# Patient Record
Sex: MALE | Race: WHITE | NOT HISPANIC OR LATINO | Employment: OTHER | URBAN - METROPOLITAN AREA
[De-identification: names, ages, dates, MRNs, and addresses within clinical notes are randomized per-mention and may not be internally consistent; named-entity substitution may affect disease eponyms.]

---

## 2024-01-25 PROBLEM — K64.8 INTERNAL HEMORRHOIDS: Status: ACTIVE | Noted: 2024-01-25

## 2024-01-25 PROBLEM — S86.819A STRAIN OF CALF MUSCLE: Status: ACTIVE | Noted: 2024-01-25

## 2024-01-25 PROBLEM — E80.6 HYPERBILIRUBINEMIA: Status: ACTIVE | Noted: 2024-01-25

## 2024-01-25 PROBLEM — M43.10 DEGENERATIVE SPONDYLOLISTHESIS: Status: ACTIVE | Noted: 2024-01-25

## 2024-01-25 PROBLEM — M54.2 NECK PAIN: Status: ACTIVE | Noted: 2024-01-25

## 2024-01-25 PROBLEM — R73.01 IFG (IMPAIRED FASTING GLUCOSE): Status: ACTIVE | Noted: 2024-01-25

## 2024-01-25 PROBLEM — I10 HTN (HYPERTENSION): Status: ACTIVE | Noted: 2024-01-25

## 2024-01-25 PROBLEM — M51.26 HERNIATED LUMBAR INTERVERTEBRAL DISC: Status: ACTIVE | Noted: 2024-01-25

## 2024-01-25 PROBLEM — M54.41 LOW BACK PAIN WITH RIGHT-SIDED SCIATICA: Status: ACTIVE | Noted: 2024-01-25

## 2024-01-25 PROBLEM — N28.1 CYST OF LEFT KIDNEY: Status: ACTIVE | Noted: 2024-01-25

## 2024-01-25 PROBLEM — I44.7 LEFT BUNDLE BRANCH BLOCK (LBBB): Status: ACTIVE | Noted: 2024-01-25

## 2024-01-25 PROBLEM — F32.A ANXIETY AND DEPRESSION: Status: ACTIVE | Noted: 2024-01-25

## 2024-01-25 PROBLEM — K76.89 LIVER CYST: Status: ACTIVE | Noted: 2024-01-25

## 2024-01-25 PROBLEM — F41.9 ANXIETY AND DEPRESSION: Status: ACTIVE | Noted: 2024-01-25

## 2024-01-25 PROBLEM — Z86.0100 HISTORY OF COLON POLYPS: Status: ACTIVE | Noted: 2024-01-25

## 2024-01-25 PROBLEM — K57.90 DIVERTICULOSIS: Status: ACTIVE | Noted: 2024-01-25

## 2024-01-25 PROBLEM — K21.9 GERD (GASTROESOPHAGEAL REFLUX DISEASE): Status: ACTIVE | Noted: 2024-01-25

## 2024-01-25 PROBLEM — Z86.010 HISTORY OF COLON POLYPS: Status: ACTIVE | Noted: 2024-01-25

## 2024-01-25 PROBLEM — E66.9 OBESITY: Status: ACTIVE | Noted: 2024-01-25

## 2024-02-01 ENCOUNTER — OFFICE VISIT (OUTPATIENT)
Dept: CARDIOLOGY | Facility: CLINIC | Age: 74
End: 2024-02-01
Payer: MEDICARE

## 2024-02-01 VITALS
HEIGHT: 72 IN | BODY MASS INDEX: 29.85 KG/M2 | SYSTOLIC BLOOD PRESSURE: 124 MMHG | DIASTOLIC BLOOD PRESSURE: 74 MMHG | OXYGEN SATURATION: 97 % | HEART RATE: 77 BPM | WEIGHT: 220.4 LBS

## 2024-02-01 DIAGNOSIS — I10 HYPERTENSION, UNSPECIFIED TYPE: ICD-10-CM

## 2024-02-01 DIAGNOSIS — I44.7 LEFT BUNDLE BRANCH BLOCK (LBBB): Primary | ICD-10-CM

## 2024-02-01 DIAGNOSIS — E66.3 OVERWEIGHT: ICD-10-CM

## 2024-02-01 DIAGNOSIS — R53.83 FATIGUE, UNSPECIFIED TYPE: ICD-10-CM

## 2024-02-01 DIAGNOSIS — K21.9 GASTROESOPHAGEAL REFLUX DISEASE WITHOUT ESOPHAGITIS: ICD-10-CM

## 2024-02-01 PROCEDURE — 1159F MED LIST DOCD IN RCRD: CPT | Performed by: INTERNAL MEDICINE

## 2024-02-01 PROCEDURE — 1160F RVW MEDS BY RX/DR IN RCRD: CPT | Performed by: INTERNAL MEDICINE

## 2024-02-01 PROCEDURE — 99215 OFFICE O/P EST HI 40 MIN: CPT | Performed by: INTERNAL MEDICINE

## 2024-02-01 PROCEDURE — 3074F SYST BP LT 130 MM HG: CPT | Performed by: INTERNAL MEDICINE

## 2024-02-01 PROCEDURE — 3078F DIAST BP <80 MM HG: CPT | Performed by: INTERNAL MEDICINE

## 2024-02-01 PROCEDURE — 93000 ELECTROCARDIOGRAM COMPLETE: CPT | Performed by: INTERNAL MEDICINE

## 2024-02-01 RX ORDER — HYDROCHLOROTHIAZIDE 12.5 MG/1
12.5 TABLET ORAL DAILY
COMMUNITY

## 2024-02-01 RX ORDER — VALSARTAN 80 MG/1
80 TABLET ORAL DAILY
COMMUNITY

## 2024-02-01 RX ORDER — TIZANIDINE 2 MG/1
TABLET ORAL
COMMUNITY
Start: 2020-08-07

## 2024-02-01 RX ORDER — PAROXETINE HYDROCHLORIDE 20 MG/1
30 TABLET, FILM COATED ORAL DAILY
COMMUNITY
Start: 2024-01-28

## 2024-02-01 RX ORDER — ALPRAZOLAM 0.25 MG/1
TABLET ORAL
COMMUNITY

## 2024-02-01 NOTE — PROGRESS NOTES
Subjective  Ruslan Espinosa  is a 73 y.o. year old male who presents for LBBB.  HE had COVID about a week ago. Feels faytgued in the afternoo and wishes to cut back on Valsartn    Blood pressure 124/74, pulse 77, height 1.829 m (6'), weight 100 kg (220 lb 6.4 oz), SpO2 97 %.   Metoprolol, Ciprofloxacin, Diltiazem hcl, Metronidazole, and Terazosin  Past Medical History:   Diagnosis Date    Neuralgia and neuritis, unspecified 10/27/2014    Nerve pain    Personal history of colonic polyps 10/27/2014    History of colonic polyps    Personal history of other diseases of the digestive system 02/13/2019    History of diverticulitis of colon    Personal history of other mental and behavioral disorders     History of panic attacks     Past Surgical History:   Procedure Laterality Date    OTHER SURGICAL HISTORY  04/01/2019    Colonoscopy    OTHER SURGICAL HISTORY  10/27/2014    Wrist Surgery Left    SINUS SURGERY  10/27/2014    Sinus Surgery     No family history on file.  @SOC    Current Outpatient Medications   Medication Sig Dispense Refill    PARoxetine (Paxil) 20 mg tablet Take 1.5 tablets (30 mg) by mouth once daily. Take 1 and 1/2 (one and one-half) tablets by mouth daily.      tiZANidine (Zanaflex) 2 mg tablet Take by mouth.      ALPRAZolam (Xanax) 0.25 mg tablet TAKE 1 TABLET BY MOUTH EVERY 6 TO 8 HOURS AS NEEDED      hydroCHLOROthiazide (HYDRODiuril) 12.5 mg tablet Take 1 tablet (12.5 mg) by mouth once daily.      valsartan (Diovan) 80 mg tablet Take 1 tablet (80 mg) by mouth once daily. for blood pressure       No current facility-administered medications for this visit.        ROS  Review of Systems   All other systems reviewed and are negative.      Physical Exam  Physical Exam  Constitutional:       Appearance: Normal appearance.   HENT:      Head: Normocephalic and atraumatic.   Eyes:      Extraocular Movements: Extraocular movements intact.      Pupils: Pupils are equal, round, and reactive to light.    Cardiovascular:      Rate and Rhythm: Normal rate and regular rhythm.      Comments: S2 paradoxically split  Pulmonary:      Effort: Pulmonary effort is normal.      Breath sounds: Normal breath sounds.   Abdominal:      Palpations: Abdomen is soft.   Musculoskeletal:      Right lower leg: No edema.      Left lower leg: No edema.   Skin:     General: Skin is warm and dry.   Neurological:      General: No focal deficit present.      Mental Status: He is alert and oriented to person, place, and time.   Psychiatric:         Mood and Affect: Mood normal.         Behavior: Behavior normal.          EKG  Encounter Date: 02/01/24   ECG 12 Lead    Narrative    NSR at 69/min.,  LBBB + LAD       Problem List Items Addressed This Visit       GERD (gastroesophageal reflux disease)    HTN (hypertension)    Relevant Orders    ECG 12 Lead (Completed)    Follow Up In Cardiology    Left bundle branch block (LBBB) - Primary    Relevant Orders    Follow Up In Cardiology    Overweight    Fatigue         Decrease Valsartan to 1/2/ tablet (40 mg) daily      Adryan Gray MD

## 2024-02-02 PROBLEM — R53.83 FATIGUE: Status: ACTIVE | Noted: 2024-02-02

## 2024-07-15 DIAGNOSIS — I10 HYPERTENSION, UNSPECIFIED TYPE: ICD-10-CM

## 2024-07-15 DIAGNOSIS — I44.7 LEFT BUNDLE BRANCH BLOCK (LBBB): ICD-10-CM

## 2024-07-15 PROBLEM — E66.9 OBESITY: Status: RESOLVED | Noted: 2024-01-25 | Resolved: 2024-07-15

## 2024-07-15 PROBLEM — R79.89 ABNORMAL LIVER FUNCTION TESTS: Status: ACTIVE | Noted: 2024-07-15

## 2024-07-15 NOTE — TELEPHONE ENCOUNTER
Patient asking for refill of 80 mg valsartan and states he takes half.  It looks like he takes a whole tablet.  Please clarify

## 2024-07-19 RX ORDER — HYDROCHLOROTHIAZIDE 12.5 MG/1
12.5 TABLET ORAL DAILY
Qty: 90 TABLET | Refills: 3 | Status: SHIPPED | OUTPATIENT
Start: 2024-07-19

## 2024-07-19 RX ORDER — VALSARTAN 40 MG/1
40 TABLET ORAL DAILY
Qty: 90 TABLET | Refills: 3 | Status: SHIPPED | OUTPATIENT
Start: 2024-07-19

## 2024-08-01 ENCOUNTER — APPOINTMENT (OUTPATIENT)
Dept: CARDIOLOGY | Facility: CLINIC | Age: 74
End: 2024-08-01
Payer: MEDICARE

## 2024-08-01 ENCOUNTER — LAB (OUTPATIENT)
Dept: LAB | Facility: LAB | Age: 74
End: 2024-08-01
Payer: MEDICARE

## 2024-08-01 VITALS
HEART RATE: 73 BPM | SYSTOLIC BLOOD PRESSURE: 144 MMHG | WEIGHT: 225 LBS | OXYGEN SATURATION: 96 % | HEIGHT: 72 IN | BODY MASS INDEX: 30.48 KG/M2 | DIASTOLIC BLOOD PRESSURE: 80 MMHG

## 2024-08-01 DIAGNOSIS — E66.3 OVERWEIGHT: ICD-10-CM

## 2024-08-01 DIAGNOSIS — K21.9 GASTROESOPHAGEAL REFLUX DISEASE WITHOUT ESOPHAGITIS: ICD-10-CM

## 2024-08-01 DIAGNOSIS — I10 HYPERTENSION, UNSPECIFIED TYPE: ICD-10-CM

## 2024-08-01 DIAGNOSIS — I44.7 LEFT BUNDLE BRANCH BLOCK (LBBB): ICD-10-CM

## 2024-08-01 DIAGNOSIS — I10 HYPERTENSION, UNSPECIFIED TYPE: Primary | ICD-10-CM

## 2024-08-01 LAB
ALBUMIN SERPL BCP-MCNC: 4.6 G/DL (ref 3.4–5)
ALP SERPL-CCNC: 48 U/L (ref 33–136)
ALT SERPL W P-5'-P-CCNC: 30 U/L (ref 10–52)
ANION GAP SERPL CALC-SCNC: 13 MMOL/L (ref 10–20)
AST SERPL W P-5'-P-CCNC: 22 U/L (ref 9–39)
BASOPHILS # BLD AUTO: 0.03 X10*3/UL (ref 0–0.1)
BASOPHILS NFR BLD AUTO: 0.5 %
BILIRUB SERPL-MCNC: 0.9 MG/DL (ref 0–1.2)
BUN SERPL-MCNC: 19 MG/DL (ref 6–23)
CALCIUM SERPL-MCNC: 9.6 MG/DL (ref 8.6–10.3)
CHLORIDE SERPL-SCNC: 102 MMOL/L (ref 98–107)
CHOLEST SERPL-MCNC: 234 MG/DL (ref 0–199)
CHOLESTEROL/HDL RATIO: 3.2
CO2 SERPL-SCNC: 27 MMOL/L (ref 21–32)
CREAT SERPL-MCNC: 1.06 MG/DL (ref 0.5–1.3)
EGFRCR SERPLBLD CKD-EPI 2021: 75 ML/MIN/1.73M*2
EOSINOPHIL # BLD AUTO: 0.09 X10*3/UL (ref 0–0.4)
EOSINOPHIL NFR BLD AUTO: 1.4 %
ERYTHROCYTE [DISTWIDTH] IN BLOOD BY AUTOMATED COUNT: 13.1 % (ref 11.5–14.5)
GLUCOSE SERPL-MCNC: 91 MG/DL (ref 74–99)
HCT VFR BLD AUTO: 48.4 % (ref 41–52)
HDLC SERPL-MCNC: 72.5 MG/DL
HGB BLD-MCNC: 16 G/DL (ref 13.5–17.5)
IMM GRANULOCYTES # BLD AUTO: 0.02 X10*3/UL (ref 0–0.5)
IMM GRANULOCYTES NFR BLD AUTO: 0.3 % (ref 0–0.9)
LYMPHOCYTES # BLD AUTO: 1.94 X10*3/UL (ref 0.8–3)
LYMPHOCYTES NFR BLD AUTO: 30.1 %
MCH RBC QN AUTO: 30.5 PG (ref 26–34)
MCHC RBC AUTO-ENTMCNC: 33.1 G/DL (ref 32–36)
MCV RBC AUTO: 92 FL (ref 80–100)
MONOCYTES # BLD AUTO: 0.61 X10*3/UL (ref 0.05–0.8)
MONOCYTES NFR BLD AUTO: 9.5 %
NEUTROPHILS # BLD AUTO: 3.75 X10*3/UL (ref 1.6–5.5)
NEUTROPHILS NFR BLD AUTO: 58.2 %
NON-HDL CHOLESTEROL: 162 MG/DL (ref 0–149)
NRBC BLD-RTO: 0 /100 WBCS (ref 0–0)
PLATELET # BLD AUTO: 271 X10*3/UL (ref 150–450)
POTASSIUM SERPL-SCNC: 4.2 MMOL/L (ref 3.5–5.3)
PROT SERPL-MCNC: 7.5 G/DL (ref 6.4–8.2)
RBC # BLD AUTO: 5.24 X10*6/UL (ref 4.5–5.9)
SODIUM SERPL-SCNC: 138 MMOL/L (ref 136–145)
WBC # BLD AUTO: 6.4 X10*3/UL (ref 4.4–11.3)

## 2024-08-01 PROCEDURE — 80053 COMPREHEN METABOLIC PANEL: CPT

## 2024-08-01 PROCEDURE — 85025 COMPLETE CBC W/AUTO DIFF WBC: CPT

## 2024-08-01 PROCEDURE — 36415 COLL VENOUS BLD VENIPUNCTURE: CPT

## 2024-08-01 PROCEDURE — 83718 ASSAY OF LIPOPROTEIN: CPT

## 2024-08-01 PROCEDURE — 82465 ASSAY BLD/SERUM CHOLESTEROL: CPT

## 2024-08-01 NOTE — PROGRESS NOTES
Subjective  Ruslan Espinosa  is a 74 y.o. year old male who presents F/U LBBB.  He wrenched his neck a week ago otherwise is doing weel.  No chest pain, no palpitations, no edema. He has not seen Dr. Vo or had blood work in years    Blood pressure 144/80, pulse 73, height 1.829 m (6'), weight 102 kg (225 lb), SpO2 96%.   Metoprolol, Ciprofloxacin, Diltiazem hcl, Metronidazole, and Terazosin  Past Medical History:   Diagnosis Date    Neuralgia and neuritis, unspecified 10/27/2014    Nerve pain    Personal history of colonic polyps 10/27/2014    History of colonic polyps    Personal history of other diseases of the digestive system 02/13/2019    History of diverticulitis of colon    Personal history of other mental and behavioral disorders     History of panic attacks     Past Surgical History:   Procedure Laterality Date    OTHER SURGICAL HISTORY  04/01/2019    Colonoscopy    OTHER SURGICAL HISTORY  10/27/2014    Wrist Surgery Left    SINUS SURGERY  10/27/2014    Sinus Surgery     No family history on file.  @SOC    Current Outpatient Medications   Medication Sig Dispense Refill    ALPRAZolam (Xanax) 0.25 mg tablet TAKE 1 TABLET BY MOUTH EVERY 6 TO 8 HOURS AS NEEDED      hydroCHLOROthiazide (Microzide) 12.5 mg tablet Take 1 tablet (12.5 mg) by mouth once daily. 90 tablet 3    PARoxetine (Paxil) 20 mg tablet Take 1.5 tablets (30 mg) by mouth once daily. Take 1 and 1/2 (one and one-half) tablets by mouth daily.      valsartan (Diovan) 40 mg tablet Take 1 tablet (40 mg) by mouth once daily. for blood pressure 90 tablet 3     No current facility-administered medications for this visit.        ROS  Review of Systems   All other systems reviewed and are negative.      Physical Exam  Physical Exam  Constitutional:       Appearance: He is obese.   HENT:      Head: Normocephalic and atraumatic.   Cardiovascular:      Comments: S2 paradoxically split  Pulmonary:      Breath sounds: Normal breath sounds.   Skin:      General: Skin is warm and dry.   Neurological:      General: No focal deficit present.      Mental Status: He is alert and oriented to person, place, and time.   Psychiatric:         Mood and Affect: Mood normal.         Behavior: Behavior normal.          EKG  Encounter Date: 08/01/24   ECG 12 Lead    Narrative    NSR at 73/min., occ. PVC's. LBBB + LAD       Problem List Items Addressed This Visit       GERD (gastroesophageal reflux disease)    HTN (hypertension) - Primary    Relevant Orders    ECG 12 Lead (Completed)    Lipid Panel Non-Fasting    Comprehensive metabolic panel    CBC and Auto Differential    Follow Up In Cardiology    Left bundle branch block (LBBB)    Relevant Orders    Comprehensive metabolic panel    CBC and Auto Differential    Follow Up In Cardiology    Overweight    Relevant Orders    Lipid Panel Non-Fasting    Comprehensive metabolic panel       CMP. Lipid profile, CBC  Return 6 months with EKG        Adryan Gray MD

## 2025-01-13 PROBLEM — K63.5 COLON POLYPS: Status: ACTIVE | Noted: 2025-01-13

## 2025-01-13 PROBLEM — J30.9 ALLERGIC RHINITIS: Status: ACTIVE | Noted: 2025-01-13

## 2025-01-13 PROBLEM — K63.5 COLON POLYPS: Status: RESOLVED | Noted: 2025-01-13 | Resolved: 2025-01-13

## 2025-01-13 PROBLEM — E66.811 CLASS 1 OBESITY WITH BODY MASS INDEX (BMI) OF 30.0 TO 30.9 IN ADULT: Status: ACTIVE | Noted: 2024-02-01

## 2025-02-03 ENCOUNTER — OFFICE VISIT (OUTPATIENT)
Dept: CARDIOLOGY | Facility: CLINIC | Age: 75
End: 2025-02-03
Payer: MEDICARE

## 2025-02-03 VITALS
WEIGHT: 225 LBS | HEIGHT: 72 IN | SYSTOLIC BLOOD PRESSURE: 132 MMHG | HEART RATE: 76 BPM | OXYGEN SATURATION: 97 % | BODY MASS INDEX: 30.48 KG/M2 | DIASTOLIC BLOOD PRESSURE: 82 MMHG

## 2025-02-03 DIAGNOSIS — I44.7 LEFT BUNDLE BRANCH BLOCK (LBBB): Primary | ICD-10-CM

## 2025-02-03 DIAGNOSIS — K21.9 GASTROESOPHAGEAL REFLUX DISEASE WITHOUT ESOPHAGITIS: ICD-10-CM

## 2025-02-03 DIAGNOSIS — E78.00 PURE HYPERCHOLESTEROLEMIA: ICD-10-CM

## 2025-02-03 DIAGNOSIS — E66.811 CLASS 1 OBESITY WITHOUT SERIOUS COMORBIDITY WITH BODY MASS INDEX (BMI) OF 30.0 TO 30.9 IN ADULT, UNSPECIFIED OBESITY TYPE: ICD-10-CM

## 2025-02-03 DIAGNOSIS — I10 HYPERTENSION, UNSPECIFIED TYPE: ICD-10-CM

## 2025-02-03 LAB
ATRIAL RATE: 70 BPM
P AXIS: 40 DEGREES
P OFFSET: 166 MS
P ONSET: 111 MS
PR INTERVAL: 204 MS
Q ONSET: 213 MS
QRS COUNT: 12 BEATS
QRS DURATION: 154 MS
QT INTERVAL: 418 MS
QTC CALCULATION(BAZETT): 451 MS
QTC FREDERICIA: 440 MS
R AXIS: -31 DEGREES
T AXIS: 117 DEGREES
T OFFSET: 422 MS
VENTRICULAR RATE: 70 BPM

## 2025-02-03 PROCEDURE — 3008F BODY MASS INDEX DOCD: CPT | Performed by: INTERNAL MEDICINE

## 2025-02-03 PROCEDURE — 1160F RVW MEDS BY RX/DR IN RCRD: CPT | Performed by: INTERNAL MEDICINE

## 2025-02-03 PROCEDURE — 99214 OFFICE O/P EST MOD 30 MIN: CPT | Performed by: INTERNAL MEDICINE

## 2025-02-03 PROCEDURE — 93010 ELECTROCARDIOGRAM REPORT: CPT | Performed by: INTERNAL MEDICINE

## 2025-02-03 PROCEDURE — 93005 ELECTROCARDIOGRAM TRACING: CPT | Performed by: INTERNAL MEDICINE

## 2025-02-03 PROCEDURE — 3075F SYST BP GE 130 - 139MM HG: CPT | Performed by: INTERNAL MEDICINE

## 2025-02-03 PROCEDURE — 3079F DIAST BP 80-89 MM HG: CPT | Performed by: INTERNAL MEDICINE

## 2025-02-03 PROCEDURE — 1159F MED LIST DOCD IN RCRD: CPT | Performed by: INTERNAL MEDICINE

## 2025-02-03 NOTE — PROGRESS NOTES
Subjective  Ruslan Espinosa  is a 74 y.o. year old male who presents for LBBB, No chest pain, no  palpitations, no edema, Refuses lipid profile and is agreeable to weight loss    Blood pressure 132/82, pulse 76, height 1.829 m (6'), weight 102 kg (225 lb), SpO2 97%.   Metoprolol, Ciprofloxacin, Diltiazem hcl, Metronidazole, and Terazosin  Past Medical History:   Diagnosis Date    Neuralgia and neuritis, unspecified 10/27/2014    Nerve pain    Personal history of colonic polyps 10/27/2014    History of colonic polyps    Personal history of other diseases of the digestive system 02/13/2019    History of diverticulitis of colon    Personal history of other mental and behavioral disorders     History of panic attacks     Past Surgical History:   Procedure Laterality Date    OTHER SURGICAL HISTORY  04/01/2019    Colonoscopy    OTHER SURGICAL HISTORY  10/27/2014    Wrist Surgery Left    SINUS SURGERY  10/27/2014    Sinus Surgery     No family history on file.  @SOC    Current Outpatient Medications   Medication Sig Dispense Refill    ALPRAZolam (Xanax) 0.25 mg tablet TAKE 1 TABLET BY MOUTH EVERY 6 TO 8 HOURS AS NEEDED      hydroCHLOROthiazide (Microzide) 12.5 mg tablet Take 1 tablet (12.5 mg) by mouth once daily. 90 tablet 3    PARoxetine (Paxil) 20 mg tablet Take 1.5 tablets (30 mg) by mouth once daily. Take 1 and 1/2 (one and one-half) tablets by mouth daily.      valsartan (Diovan) 40 mg tablet Take 1 tablet (40 mg) by mouth once daily. for blood pressure 90 tablet 3     No current facility-administered medications for this visit.        ROS  Review of Systems   All other systems reviewed and are negative.      Physical Exam  Physical Exam  Constitutional:       Appearance: Normal appearance.   HENT:      Head: Normocephalic and atraumatic.   Cardiovascular:      Rate and Rhythm: Normal rate and regular rhythm.   Pulmonary:      Effort: Pulmonary effort is normal.      Breath sounds: Normal breath sounds.    Abdominal:      Palpations: Abdomen is soft.   Musculoskeletal:      Right lower leg: No edema.      Left lower leg: No edema.   Skin:     General: Skin is warm and dry.   Neurological:      General: No focal deficit present.      Mental Status: He is alert and oriented to person, place, and time.   Psychiatric:         Mood and Affect: Mood normal.         Behavior: Behavior normal.          EKG  No results found for this or any previous visit (from the past 4464 hours).    Problem List Items Addressed This Visit       GERD (gastroesophageal reflux disease)    HTN (hypertension)    Relevant Orders    ECG 12 Lead    Follow Up In Cardiology    Left bundle branch block (LBBB) - Primary    Relevant Orders    Follow Up In Cardiology    Class 1 obesity with body mass index (BMI) of 30.0 to 30.9 in adult    Pure hypercholesterolemia         Lose weight   Return 6 months with EKG      Adryan Gray MD

## 2025-04-24 ENCOUNTER — TELEPHONE (OUTPATIENT)
Dept: PRIMARY CARE | Facility: CLINIC | Age: 75
End: 2025-04-24
Payer: MEDICARE

## 2025-07-22 DIAGNOSIS — I10 HYPERTENSION, UNSPECIFIED TYPE: ICD-10-CM

## 2025-07-22 DIAGNOSIS — I44.7 LEFT BUNDLE BRANCH BLOCK (LBBB): ICD-10-CM

## 2025-07-22 RX ORDER — VALSARTAN 40 MG/1
40 TABLET ORAL DAILY
Qty: 90 TABLET | Refills: 3 | Status: SHIPPED | OUTPATIENT
Start: 2025-07-22

## 2025-07-22 RX ORDER — HYDROCHLOROTHIAZIDE 12.5 MG/1
12.5 TABLET ORAL DAILY
Qty: 90 TABLET | Refills: 3 | Status: SHIPPED | OUTPATIENT
Start: 2025-07-22

## 2025-08-05 ENCOUNTER — OFFICE VISIT (OUTPATIENT)
Dept: CARDIOLOGY | Facility: CLINIC | Age: 75
End: 2025-08-05
Payer: MEDICARE

## 2025-08-05 VITALS
SYSTOLIC BLOOD PRESSURE: 126 MMHG | HEART RATE: 61 BPM | BODY MASS INDEX: 26.01 KG/M2 | WEIGHT: 192 LBS | OXYGEN SATURATION: 96 % | HEIGHT: 72 IN | DIASTOLIC BLOOD PRESSURE: 72 MMHG

## 2025-08-05 DIAGNOSIS — I10 HYPERTENSION, UNSPECIFIED TYPE: ICD-10-CM

## 2025-08-05 DIAGNOSIS — E78.00 PURE HYPERCHOLESTEROLEMIA: ICD-10-CM

## 2025-08-05 DIAGNOSIS — K21.9 GASTROESOPHAGEAL REFLUX DISEASE WITHOUT ESOPHAGITIS: ICD-10-CM

## 2025-08-05 DIAGNOSIS — I44.7 LEFT BUNDLE BRANCH BLOCK (LBBB): Primary | ICD-10-CM

## 2025-08-05 LAB
ATRIAL RATE: 64 BPM
P AXIS: 13 DEGREES
P OFFSET: 112 MS
P ONSET: 64 MS
PR INTERVAL: 218 MS
Q ONSET: 173 MS
QRS COUNT: 11 BEATS
QRS DURATION: 152 MS
QT INTERVAL: 420 MS
QTC CALCULATION(BAZETT): 433 MS
QTC FREDERICIA: 429 MS
R AXIS: -43 DEGREES
T AXIS: 119 DEGREES
T OFFSET: 383 MS
VENTRICULAR RATE: 64 BPM

## 2025-08-05 PROCEDURE — 3078F DIAST BP <80 MM HG: CPT | Performed by: INTERNAL MEDICINE

## 2025-08-05 PROCEDURE — 93005 ELECTROCARDIOGRAM TRACING: CPT | Performed by: INTERNAL MEDICINE

## 2025-08-05 PROCEDURE — 99212 OFFICE O/P EST SF 10 MIN: CPT

## 2025-08-05 PROCEDURE — 1159F MED LIST DOCD IN RCRD: CPT | Performed by: INTERNAL MEDICINE

## 2025-08-05 PROCEDURE — 93010 ELECTROCARDIOGRAM REPORT: CPT | Performed by: INTERNAL MEDICINE

## 2025-08-05 PROCEDURE — 3074F SYST BP LT 130 MM HG: CPT | Performed by: INTERNAL MEDICINE

## 2025-08-05 PROCEDURE — 3008F BODY MASS INDEX DOCD: CPT | Performed by: INTERNAL MEDICINE

## 2025-08-05 PROCEDURE — 1160F RVW MEDS BY RX/DR IN RCRD: CPT | Performed by: INTERNAL MEDICINE

## 2025-08-05 PROCEDURE — 99213 OFFICE O/P EST LOW 20 MIN: CPT | Performed by: INTERNAL MEDICINE

## 2025-08-05 PROCEDURE — 1036F TOBACCO NON-USER: CPT | Performed by: INTERNAL MEDICINE

## 2025-08-05 NOTE — PROGRESS NOTES
Subjective  Ruslan Espinosa  is a 75 y.o. year old male who presents for F/U LBBB.  Feels great having lost with, no chest pain, no dyspnea, no palpitations, no edema    Blood pressure 126/72, pulse 61, height 1.829 m (6'), weight 87.1 kg (192 lb), SpO2 96%.   Metoprolol, Ciprofloxacin, Diltiazem hcl, Metronidazole, and Terazosin  Medical History[1]  Surgical History[2]  Family History[3]  @SOC    Current Medications[4]     ROS  Review of Systems   All other systems reviewed and are negative.      Physical Exam  Physical Exam  Constitutional:       Appearance: Normal appearance.   HENT:      Head: Normocephalic and atraumatic.     Cardiovascular:      Rate and Rhythm: Normal rate and regular rhythm.      Comments: S2 paradoxically split  Pulmonary:      Effort: Pulmonary effort is normal.      Breath sounds: Normal breath sounds.   Abdominal:      General: Abdomen is flat.     Musculoskeletal:      Right lower leg: No edema.      Left lower leg: No edema.     Skin:     General: Skin is warm and dry.     Neurological:      General: No focal deficit present.      Mental Status: He is alert and oriented to person, place, and time.     Psychiatric:         Mood and Affect: Mood normal.          EKG  Encounter Date: 02/03/25   ECG 12 Lead   Result Value    Ventricular Rate 70    Atrial Rate 70    NJ Interval 204    QRS Duration 154    QT Interval 418    QTC Calculation(Bazett) 451    P Axis 40    R Axis -31    T Axis 117    QRS Count 12    Q Onset 213    P Onset 111    P Offset 166    T Offset 422    QTC Fredericia 440    Narrative    Sinus rhythm with occasional Premature ventricular complexes  Left axis deviation  Left bundle branch block  Abnormal ECG  No previous ECGs available  Confirmed by Adryan Gray ([email or web address]) on 2/3/2025 12:44:38 PM       Problem List Items Addressed This Visit       GERD (gastroesophageal reflux disease)    HTN (hypertension)    Relevant Orders    ECG 12 Lead    Left bundle branch block (LBBB)  - Primary    8/5/25 EKG NSR at 64/min., 1st deg. AV block, LBBB = LAD         Pure hypercholesterolemia         Same meds  Return 6 months with EKG      Adryan Gray MD        [1]   Past Medical History:  Diagnosis Date    Neuralgia and neuritis, unspecified 10/27/2014    Nerve pain    Personal history of colonic polyps 10/27/2014    History of colonic polyps    Personal history of other diseases of the digestive system 02/13/2019    History of diverticulitis of colon    Personal history of other mental and behavioral disorders     History of panic attacks   [2]   Past Surgical History:  Procedure Laterality Date    OTHER SURGICAL HISTORY  04/01/2019    Colonoscopy    OTHER SURGICAL HISTORY  10/27/2014    Wrist Surgery Left    SINUS SURGERY  10/27/2014    Sinus Surgery   [3] No family history on file.  [4]   Current Outpatient Medications   Medication Sig Dispense Refill    ALPRAZolam (Xanax) 0.25 mg tablet TAKE 1 TABLET BY MOUTH EVERY 6 TO 8 HOURS AS NEEDED      hydroCHLOROthiazide (Microzide) 12.5 mg tablet Take 1 tablet (12.5 mg) by mouth once daily. 90 tablet 3    PARoxetine (Paxil) 20 mg tablet Take 1.5 tablets (30 mg) by mouth once daily. Take 1 and 1/2 (one and one-half) tablets by mouth daily.      valsartan (Diovan) 40 mg tablet Take 1 tablet (40 mg) by mouth once daily. for blood pressure 90 tablet 3     No current facility-administered medications for this visit.